# Patient Record
Sex: MALE | Race: WHITE | NOT HISPANIC OR LATINO | Employment: OTHER | URBAN - METROPOLITAN AREA
[De-identification: names, ages, dates, MRNs, and addresses within clinical notes are randomized per-mention and may not be internally consistent; named-entity substitution may affect disease eponyms.]

---

## 2023-10-27 PROCEDURE — 99282 EMERGENCY DEPT VISIT SF MDM: CPT

## 2023-10-27 PROCEDURE — 99283 EMERGENCY DEPT VISIT LOW MDM: CPT | Mod: 25

## 2023-10-27 PROCEDURE — 94760 N-INVAS EAR/PLS OXIMETRY 1: CPT

## 2023-10-27 ASSESSMENT — COLUMBIA-SUICIDE SEVERITY RATING SCALE - C-SSRS
6. HAVE YOU EVER DONE ANYTHING, STARTED TO DO ANYTHING, OR PREPARED TO DO ANYTHING TO END YOUR LIFE?: NO
2. HAVE YOU ACTUALLY HAD ANY THOUGHTS OF KILLING YOURSELF?: NO
1. IN THE PAST MONTH, HAVE YOU WISHED YOU WERE DEAD OR WISHED YOU COULD GO TO SLEEP AND NOT WAKE UP?: NO

## 2023-10-27 ASSESSMENT — PAIN DESCRIPTION - LOCATION: LOCATION: MOUTH

## 2023-10-27 ASSESSMENT — PAIN SCALES - GENERAL: PAINLEVEL_OUTOF10: 0 - NO PAIN

## 2023-10-27 ASSESSMENT — PAIN - FUNCTIONAL ASSESSMENT: PAIN_FUNCTIONAL_ASSESSMENT: 0-10

## 2023-10-28 ENCOUNTER — HOSPITAL ENCOUNTER (EMERGENCY)
Facility: HOSPITAL | Age: 88
Discharge: HOME | End: 2023-10-28
Payer: MEDICARE

## 2023-10-28 VITALS
HEART RATE: 80 BPM | TEMPERATURE: 97.9 F | WEIGHT: 221.34 LBS | RESPIRATION RATE: 16 BRPM | BODY MASS INDEX: 35.57 KG/M2 | HEIGHT: 66 IN | OXYGEN SATURATION: 96 % | DIASTOLIC BLOOD PRESSURE: 70 MMHG | SYSTOLIC BLOOD PRESSURE: 144 MMHG

## 2023-10-28 DIAGNOSIS — K06.8 BLEEDING GUMS: Primary | ICD-10-CM

## 2023-10-28 PROCEDURE — 2500000005 HC RX 250 GENERAL PHARMACY W/O HCPCS: Performed by: PHYSICIAN ASSISTANT

## 2023-10-28 PROCEDURE — 2500000001 HC RX 250 WO HCPCS SELF ADMINISTERED DRUGS (ALT 637 FOR MEDICARE OP): Performed by: PHYSICIAN ASSISTANT

## 2023-10-28 RX ORDER — TRANEXAMIC ACID 650 MG/1
1300 TABLET ORAL 3 TIMES DAILY
Status: DISCONTINUED | OUTPATIENT
Start: 2023-10-28 | End: 2023-10-28

## 2023-10-28 RX ORDER — TRANEXAMIC ACID 100 MG/ML
1000 INJECTION, SOLUTION INTRAVENOUS ONCE
Status: COMPLETED | OUTPATIENT
Start: 2023-10-28 | End: 2023-10-28

## 2023-10-28 RX ADMIN — TRANEXAMIC ACID 1000 MG: 1 INJECTION, SOLUTION INTRAVENOUS at 00:35

## 2023-10-28 RX ADMIN — MICROFIBRILLAR COLLAGEN HEMOSTAT POWDER 1 EACH: POWDER at 02:34

## 2023-10-28 ASSESSMENT — LIFESTYLE VARIABLES
EVER HAD A DRINK FIRST THING IN THE MORNING TO STEADY YOUR NERVES TO GET RID OF A HANGOVER: NO
HAVE PEOPLE ANNOYED YOU BY CRITICIZING YOUR DRINKING: NO
REASON UNABLE TO ASSESS: NO
HAVE YOU EVER FELT YOU SHOULD CUT DOWN ON YOUR DRINKING: NO
EVER FELT BAD OR GUILTY ABOUT YOUR DRINKING: NO

## 2023-10-28 NOTE — ED PROVIDER NOTES
[facility]   Chief Complaint   Patient presents with    Bleeding      Pt had oral surgery this morning at 11am for removal of his bottom teeth. Pt is currently on Eliquis and cannot get the bleeding to stop. Guaze and pressure have been applied prior to arrival.        HPI     Patient is a 89-year-old male presenting for evaluation of bleeding to his lower gums.  Patient is on a blood thinner and has been unable to get the bleeding to stop.  He was not told to hold his Eliquis today as he did have his bottom teeth removed by an oral surgeon earlier this afternoon at approximately 12 PM.  He denies pain.  Denying trouble breathing or swallowing.  Complaining only of active bleeding to the lower gumline.               No data recorded                Patient History   No past medical history on file.  No past surgical history on file.  No family history on file.  Social History     Tobacco Use    Smoking status: Not on file    Smokeless tobacco: Not on file   Substance Use Topics    Alcohol use: Not on file    Drug use: Not on file       Physical Exam   ED Triage Vitals [10/27/23 2335]   Temp Heart Rate Resp BP   -- 81 18 144/70      SpO2 Temp src Heart Rate Source Patient Position   96 % -- Brachial Sitting      BP Location FiO2 (%)     Right arm --       Physical Exam    GENERAL: Well nourished and well developed. Answers questions appropriately.    SKIN: Clean and dry without evidence of rashes.    HEENT: Skull normocephalic, atraumatic. No scleral icterus. PERRLA, with EOMI.  Mucous membranes moist and pink in color. Supple neck, trachea midline. No lymphadenopathy or masses.  Active bleeding from the lower gumline without evidence of teeth.  No obvious head trauma, palpable hematoma, huynh sign or raccoon eyes.    RESPIRATORY: Clear to ausculation with normal effort. No adventitious sounds, intercostal retractions or shallow breathing.    CARDIOVASCULAR: Regular rate and rhythm with normal S1, S2. No S3, S4, murmurs or  gallops. Capillary refill brisk, less than 3 seconds bilaterally. No unilateral lower extremity edema.    MSK: Spontaneously moves all 4 extremities without difficulty. Equal sensation of all 4 extremities.  No joint effusions, clubbing, cyanosis, or edema.    NEURO/PSYCH: A&Ox3. Cranial nerves II-XII grossly intact. No focal motor or sensory deficits. Appropriate mood and behavior.    ED Course & MDM   Diagnoses as of 10/28/23 0233   Bleeding gums       Medical Decision Making  Parts of this chart have been completed using voice recognition software. Please excuse any errors of transcription. Despite the medical decision making time stamp above-my medical decision making has taken place during the patient's entire visit. My thought process and reason for plan has been formulated from the time that I saw the patient until the time of disposition and is not specific to one specific moment during their visit and furthermore my MDM encompasses this entire chart and not only this text box.      HPI: Detailed above.    Exam: A medically appropriate exam performed, outlined above, given the known history and presentation.    History obtained from: patient    Social Determinants of Health considered during this visit: age, coming from home    EKG interpreted by my attending physician, reviewed by myself.    Labs/Diagnostics: considered but not indicated for this visit    Medications given during visit: Surgicell, collagen    Differential diagnoses considered for this visit include: Postop complication    Considerations/further MDM:    Patient is a 89-year-old male presenting for evaluation of bleeding of his lower gumline.  Given the presence of active bleeding, Surgicel was applied for attempted relief.    12:15am - surgicell was unsuccessful but there was increased in clotting. Ice was applied with continuous pressure with gauze pad.    12:30am - ice with pressure was unsuccessful. TXA topical solution applied to gauze  with patient now biting down and applying pressure.    1:34am -dentistry was consulted and I spoke directly to Dr. Bruce Espinoza, who recommended applying micro fibrillar collagen (Avitene) to the sockets.  He stated that is the only thing that would get the bleeding to stop given that the patient was not told to hold his anticoagulants and is bleeding from an open socket.    2:23am - Avitene was packed into patient's lower socket.  Bleeding subsided within 5 to 10 minutes.     Return precautions discussed with the patient and his daughter at the bedside.  Released to home in good condition.    Procedure  Procedures     Terri Ramos PA-C  10/28/23 0232

## 2023-10-28 NOTE — DISCHARGE INSTRUCTIONS
Your bleeding was able to be stopped here in the emergency department.  Continue to perform home care instructions as by your oral surgeon's recommendations.  Return to the emergency department if bleeding continues or you develop new symptoms, such as lightheadedness, dizziness, fatigue, chest pain or shortness of breath.

## 2023-11-01 ENCOUNTER — HOSPITAL ENCOUNTER (EMERGENCY)
Facility: HOSPITAL | Age: 88
Discharge: HOME | End: 2023-11-01
Attending: STUDENT IN AN ORGANIZED HEALTH CARE EDUCATION/TRAINING PROGRAM
Payer: MEDICARE

## 2023-11-01 VITALS
RESPIRATION RATE: 16 BRPM | WEIGHT: 220 LBS | OXYGEN SATURATION: 100 % | HEART RATE: 78 BPM | SYSTOLIC BLOOD PRESSURE: 143 MMHG | BODY MASS INDEX: 35.36 KG/M2 | TEMPERATURE: 99.3 F | HEIGHT: 66 IN | DIASTOLIC BLOOD PRESSURE: 71 MMHG

## 2023-11-01 DIAGNOSIS — K91.840 SURGICAL WOUND HEMORRHAGE AFTER DENTAL PROCEDURE: Primary | ICD-10-CM

## 2023-11-01 DIAGNOSIS — Z98.818 SURGICAL WOUND HEMORRHAGE AFTER DENTAL PROCEDURE: Primary | ICD-10-CM

## 2023-11-01 PROCEDURE — 2500000005 HC RX 250 GENERAL PHARMACY W/O HCPCS: Performed by: STUDENT IN AN ORGANIZED HEALTH CARE EDUCATION/TRAINING PROGRAM

## 2023-11-01 PROCEDURE — 96374 THER/PROPH/DIAG INJ IV PUSH: CPT

## 2023-11-01 PROCEDURE — 2500000001 HC RX 250 WO HCPCS SELF ADMINISTERED DRUGS (ALT 637 FOR MEDICARE OP): Performed by: STUDENT IN AN ORGANIZED HEALTH CARE EDUCATION/TRAINING PROGRAM

## 2023-11-01 PROCEDURE — 99284 EMERGENCY DEPT VISIT MOD MDM: CPT | Mod: 25 | Performed by: STUDENT IN AN ORGANIZED HEALTH CARE EDUCATION/TRAINING PROGRAM

## 2023-11-01 RX ORDER — TRANEXAMIC ACID 100 MG/ML
1000 INJECTION, SOLUTION INTRAVENOUS ONCE
Status: COMPLETED | OUTPATIENT
Start: 2023-11-01 | End: 2023-11-01

## 2023-11-01 RX ORDER — LIDOCAINE HYDROCHLORIDE AND EPINEPHRINE 10; 10 MG/ML; UG/ML
5 INJECTION, SOLUTION INFILTRATION; PERINEURAL ONCE
Status: COMPLETED | OUTPATIENT
Start: 2023-11-01 | End: 2023-11-01

## 2023-11-01 RX ADMIN — TRANEXAMIC ACID 1000 MG: 1 INJECTION, SOLUTION INTRAVENOUS at 01:59

## 2023-11-01 RX ADMIN — MICROFIBRILLAR COLLAGEN HEMOSTAT POWDER 1 EACH: POWDER at 02:00

## 2023-11-01 RX ADMIN — LIDOCAINE HYDROCHLORIDE,EPINEPHRINE BITARTRATE 5 ML: 10; .01 INJECTION, SOLUTION INFILTRATION; PERINEURAL at 02:01

## 2023-11-01 ASSESSMENT — PAIN DESCRIPTION - LOCATION: LOCATION: FACE

## 2023-11-01 ASSESSMENT — PAIN DESCRIPTION - PAIN TYPE: TYPE: ACUTE PAIN

## 2023-11-01 ASSESSMENT — LIFESTYLE VARIABLES
HAVE PEOPLE ANNOYED YOU BY CRITICIZING YOUR DRINKING: NO
EVER FELT BAD OR GUILTY ABOUT YOUR DRINKING: NO
EVER HAD A DRINK FIRST THING IN THE MORNING TO STEADY YOUR NERVES TO GET RID OF A HANGOVER: NO
HAVE YOU EVER FELT YOU SHOULD CUT DOWN ON YOUR DRINKING: NO
REASON UNABLE TO ASSESS: NO

## 2023-11-01 ASSESSMENT — PAIN SCALES - GENERAL
PAINLEVEL_OUTOF10: 2
PAINLEVEL_OUTOF10: 5 - MODERATE PAIN

## 2023-11-01 ASSESSMENT — PAIN DESCRIPTION - PROGRESSION: CLINICAL_PROGRESSION: NOT CHANGED

## 2023-11-01 ASSESSMENT — COLUMBIA-SUICIDE SEVERITY RATING SCALE - C-SSRS
2. HAVE YOU ACTUALLY HAD ANY THOUGHTS OF KILLING YOURSELF?: NO
1. IN THE PAST MONTH, HAVE YOU WISHED YOU WERE DEAD OR WISHED YOU COULD GO TO SLEEP AND NOT WAKE UP?: NO
6. HAVE YOU EVER DONE ANYTHING, STARTED TO DO ANYTHING, OR PREPARED TO DO ANYTHING TO END YOUR LIFE?: NO

## 2023-11-01 ASSESSMENT — PAIN DESCRIPTION - FREQUENCY: FREQUENCY: CONSTANT/CONTINUOUS

## 2023-11-01 ASSESSMENT — PAIN - FUNCTIONAL ASSESSMENT: PAIN_FUNCTIONAL_ASSESSMENT: 0-10

## 2023-11-01 ASSESSMENT — PAIN DESCRIPTION - DESCRIPTORS: DESCRIPTORS: ACHING

## 2023-11-01 ASSESSMENT — PAIN DESCRIPTION - ONSET: ONSET: ONGOING

## 2023-11-01 ASSESSMENT — PAIN DESCRIPTION - ORIENTATION: ORIENTATION: LEFT

## 2023-11-01 NOTE — ED PROVIDER NOTES
HPI   Chief Complaint   Patient presents with    Post-op Problem     Pt reports having oral surgery 10/27 and had to come into the ER for post op bleeding. Pt report bleeding stopped and tonight it restarted.       This is a 89-year-old male with a past medical history of A-fib on Eliquis, recent extraction of multiple teeth from the lower jaw on 10/27.  He was seen here that night for similar presentation with persistent bleeding from the gums.  He is taking his Eliquis again, his daughter at bedside state that they never had him stop the Eliquis other than the night before the procedure.  He denies any pain but states that he has been bleeding for the past few hours and is unable to get it to stop.  His daughter estimates that bleeding started around 930 tonight.      History provided by:  Patient and relative   used: No                        No data recorded                Patient History   History reviewed. No pertinent past medical history.  History reviewed. No pertinent surgical history.  No family history on file.  Social History     Tobacco Use    Smoking status: Not on file    Smokeless tobacco: Not on file   Substance Use Topics    Alcohol use: Not on file    Drug use: Not on file       Physical Exam   ED Triage Vitals [11/01/23 0138]   Temp Heart Rate Resp BP   37.4 °C (99.3 °F) 89 16 --      SpO2 Temp src Heart Rate Source Patient Position   100 % -- Monitor Sitting      BP Location FiO2 (%)     Left arm --       Physical Exam  General: well developed, well nourished elderly male who is awake and alert, holding bloody gauze in his mouth.  Eyes: sclera clear bilaterally, PERRL, EOMI  HENT: normocephalic, atraumatic. Pharynx without erythema or exudates, uvula midline.  No teeth in the lower jaw, there is a small excoriation to the left anterior lower gums that appears to be slowly oozing blood.  No pulsatile bleeding, no expanding hematoma noted.  CV: regular rate and rhythm, no  murmur, no gallops, or rubs.   Resp: clear to ascultation bilaterally, no wheezes, rales, or rhonchi    ED Course & MDM   Diagnoses as of 11/01/23 0320   Surgical wound hemorrhage after dental procedure       Medical Decision Making  PMH: CAD, asthma, A-fib on Eliquis s/p pacemaker placement, hypertension, hyperlipidemia  History obtained: directly from patient   Social factors affecting disposition: none    On arrival patient has bloodsoaked gauze in his mouth, he denies acute pain, denies any shortness of breath, chest pain, lightheadedness or dizziness associated with his bleeding.  He is still taking his Eliquis.  Vitals are normal, no signs of developing hemorrhagic shock.  After lidocaine with epinephrine was injected around the site of bleeding and TXA soaked gauze was placed in the patient's mouth, he was instructed to apply pressure with a TXA soaked gauze over the wound for 10 minutes.    On chart review from his visit from 10/27 he did have microfiber collagen pad placed over the area which achieved hemostasis at that time.  I did order this, however on reassessment the patient's bleeding was controlled after lidocaine with epinephrine injection and TXA.  The patient was observed in the ED without recurrence of his bleeding.  Collagen strip applied to prevent rebleeding. He was advised to follow-up with his oral surgeon regarding recurrent bleeding from the area.  He was discharged in stable condition.    Procedure  Procedures     Jass Ellsworth,   11/01/23 0320

## 2023-11-01 NOTE — DISCHARGE INSTRUCTIONS
Follow-up with your oral surgeon due to frequent episodes of bleeding after your procedure, return to the ED for any new or worsening symptoms including bleeding that you cannot get to stop especially if this is associated with lightheadedness or dizziness, shortness of breath, chest pain, passing out which could be signs of severe anemia due to blood loss and require further management by physician.

## 2024-02-12 ENCOUNTER — CLINICAL SUPPORT (OUTPATIENT)
Dept: AUDIOLOGY | Facility: CLINIC | Age: 89
End: 2024-02-12
Payer: MEDICARE

## 2024-02-12 DIAGNOSIS — H90.3 SENSORINEURAL HEARING LOSS (SNHL) OF BOTH EARS: ICD-10-CM

## 2024-02-12 PROCEDURE — 92557 COMPREHENSIVE HEARING TEST: CPT | Performed by: SOCIAL WORKER

## 2024-02-12 PROCEDURE — 92550 TYMPANOMETRY & REFLEX THRESH: CPT | Performed by: SOCIAL WORKER

## 2024-02-12 NOTE — PROGRESS NOTES
Name: Rcihi Llanos Jr.  YOB: 1930  Age: 89 y.o.    Date of Evaluation:  2/12/24    History:  Reason for visit:  Richi Llanos Jr. is seen today for an evaluation of hearing.  Patient complains of Hearing Loss (Re-evaluation/Previous hearing test 2/8/2023).  He further reports that he is having trouble with his hearing aids and  case. One of them is not charging and he's not sure if it's the case or the hearing aid.  [employer]o M50R aids were dispensed 2/18/21  They are under warranty until 5/17/24    Evaluation:  Otoscopy revealed moderate cerumen in the right ear and a clear left canal  Immittance testing indicated normal tympanograms bilaterally  Ipsilateral acoustic reflexes were elevated in the right ear and elevated/absent in the left ear at 500-4000 Hz  Behavioral hearing testing indicated a moderate sloping to severe sensorineural hearing loss at 125-8000 Hz bilaterally  Word recognition testing was completed using recorded speech at the patient's most comfortable level as documented on the audiogram.  Scores were 32% correct at 85 dBHL in the right ear and at 52% at 85 dBHL in the left ear.    Summary:  Today's results are consistent with a moderate sloping to severe sensorineural hearing loss at 125-8000 Hz bilaterally. Word recognition is poor in each ear at elevated presentation levels in each ear. Today's score are a decrease compared to 2/8/23  Mr. Shankar hearing aids and  will be sent to [employer] for in warranty service.  [facility]inic [employer] M90R aids were programmed for use until his aids are returned from the   Right [id number] and Left [id number]    Treatment Plan:  Follow up with PCP as directed  Retest hearing 12 months  Consistent use of binaural aids

## 2024-02-29 ENCOUNTER — CLINICAL SUPPORT (OUTPATIENT)
Dept: AUDIOLOGY | Facility: CLINIC | Age: 89
End: 2024-02-29
Payer: MEDICARE

## 2024-02-29 DIAGNOSIS — H90.3 SENSORINEURAL HEARING LOSS (SNHL) OF BOTH EARS: ICD-10-CM

## 2024-02-29 PROCEDURE — V5299 HEARING SERVICE: HCPCS | Performed by: SOCIAL WORKER

## 2024-02-29 NOTE — PROGRESS NOTES
Name: Richi Llanos Jr.  YOB: 1930  Age: 89 y.o.    Date of Evaluation:  2/29/24    History:  Reason for visit:  Richi Llanos Jr. presents for a Hearing Aid Check.  He is here accompanied by his daughter to  his in-warranty repaired hearing aids and in-warranty replacement .  Aids are under warranty until 5/17/24  Verified settings in [employer] software  Patient is very pleased with sound quality  He was last seen 2/12/24 for an audiological re-evaluation            Treatment Plan:  Consistent use of binaural hearing aids  Return for annual audiograms.    VANGIE Lema, CCC-A  Audiologist    Time:

## 2024-11-23 ENCOUNTER — HOSPITAL ENCOUNTER (EMERGENCY)
Facility: HOSPITAL | Age: 89
Discharge: HOME | End: 2024-11-23
Payer: MEDICARE

## 2024-11-23 ENCOUNTER — APPOINTMENT (OUTPATIENT)
Dept: RADIOLOGY | Facility: HOSPITAL | Age: 89
End: 2024-11-23
Payer: MEDICARE

## 2024-11-23 ENCOUNTER — APPOINTMENT (OUTPATIENT)
Dept: CARDIOLOGY | Facility: HOSPITAL | Age: 89
End: 2024-11-23
Payer: MEDICARE

## 2024-11-23 VITALS
SYSTOLIC BLOOD PRESSURE: 115 MMHG | RESPIRATION RATE: 20 BRPM | HEART RATE: 71 BPM | HEIGHT: 66 IN | TEMPERATURE: 97.5 F | OXYGEN SATURATION: 97 % | WEIGHT: 220 LBS | DIASTOLIC BLOOD PRESSURE: 59 MMHG | BODY MASS INDEX: 35.36 KG/M2

## 2024-11-23 DIAGNOSIS — J20.9 ACUTE BRONCHITIS, UNSPECIFIED ORGANISM: Primary | ICD-10-CM

## 2024-11-23 LAB
ALBUMIN SERPL BCP-MCNC: 3.9 G/DL (ref 3.4–5)
ALP SERPL-CCNC: 73 U/L (ref 33–136)
ALT SERPL W P-5'-P-CCNC: 14 U/L (ref 10–52)
ANION GAP SERPL CALCULATED.3IONS-SCNC: 10 MMOL/L (ref 10–20)
AST SERPL W P-5'-P-CCNC: 18 U/L (ref 9–39)
BASOPHILS # BLD AUTO: 0.06 X10*3/UL (ref 0–0.1)
BASOPHILS NFR BLD AUTO: 1 %
BILIRUB SERPL-MCNC: 0.7 MG/DL (ref 0–1.2)
BNP SERPL-MCNC: 294 PG/ML (ref 0–99)
BUN SERPL-MCNC: 22 MG/DL (ref 6–23)
CALCIUM SERPL-MCNC: 8.8 MG/DL (ref 8.6–10.3)
CARDIAC TROPONIN I PNL SERPL HS: 25 NG/L (ref 0–20)
CARDIAC TROPONIN I PNL SERPL HS: 27 NG/L (ref 0–20)
CHLORIDE SERPL-SCNC: 108 MMOL/L (ref 98–107)
CO2 SERPL-SCNC: 27 MMOL/L (ref 21–32)
CREAT SERPL-MCNC: 1.31 MG/DL (ref 0.5–1.3)
EGFRCR SERPLBLD CKD-EPI 2021: 50 ML/MIN/1.73M*2
EOSINOPHIL # BLD AUTO: 0.18 X10*3/UL (ref 0–0.4)
EOSINOPHIL NFR BLD AUTO: 3 %
ERYTHROCYTE [DISTWIDTH] IN BLOOD BY AUTOMATED COUNT: 12.5 % (ref 11.5–14.5)
FLUAV RNA RESP QL NAA+PROBE: NOT DETECTED
FLUBV RNA RESP QL NAA+PROBE: NOT DETECTED
GLUCOSE SERPL-MCNC: 100 MG/DL (ref 74–99)
HCT VFR BLD AUTO: 32.8 % (ref 41–52)
HGB BLD-MCNC: 11.1 G/DL (ref 13.5–17.5)
IMM GRANULOCYTES # BLD AUTO: 0.01 X10*3/UL (ref 0–0.5)
IMM GRANULOCYTES NFR BLD AUTO: 0.2 % (ref 0–0.9)
LYMPHOCYTES # BLD AUTO: 1.34 X10*3/UL (ref 0.8–3)
LYMPHOCYTES NFR BLD AUTO: 22.5 %
MCH RBC QN AUTO: 32.9 PG (ref 26–34)
MCHC RBC AUTO-ENTMCNC: 33.8 G/DL (ref 32–36)
MCV RBC AUTO: 97 FL (ref 80–100)
MONOCYTES # BLD AUTO: 0.68 X10*3/UL (ref 0.05–0.8)
MONOCYTES NFR BLD AUTO: 11.4 %
NEUTROPHILS # BLD AUTO: 3.69 X10*3/UL (ref 1.6–5.5)
NEUTROPHILS NFR BLD AUTO: 61.9 %
NRBC BLD-RTO: 0 /100 WBCS (ref 0–0)
PLATELET # BLD AUTO: 149 X10*3/UL (ref 150–450)
POTASSIUM SERPL-SCNC: 4.1 MMOL/L (ref 3.5–5.3)
PROT SERPL-MCNC: 6.1 G/DL (ref 6.4–8.2)
RBC # BLD AUTO: 3.37 X10*6/UL (ref 4.5–5.9)
SARS-COV-2 RNA RESP QL NAA+PROBE: NOT DETECTED
SODIUM SERPL-SCNC: 141 MMOL/L (ref 136–145)
WBC # BLD AUTO: 6 X10*3/UL (ref 4.4–11.3)

## 2024-11-23 PROCEDURE — 80053 COMPREHEN METABOLIC PANEL: CPT | Performed by: PHYSICIAN ASSISTANT

## 2024-11-23 PROCEDURE — 87636 SARSCOV2 & INF A&B AMP PRB: CPT | Performed by: PHYSICIAN ASSISTANT

## 2024-11-23 PROCEDURE — 84484 ASSAY OF TROPONIN QUANT: CPT | Performed by: PHYSICIAN ASSISTANT

## 2024-11-23 PROCEDURE — 71046 X-RAY EXAM CHEST 2 VIEWS: CPT | Performed by: RADIOLOGY

## 2024-11-23 PROCEDURE — 94640 AIRWAY INHALATION TREATMENT: CPT

## 2024-11-23 PROCEDURE — 71046 X-RAY EXAM CHEST 2 VIEWS: CPT

## 2024-11-23 PROCEDURE — 2500000004 HC RX 250 GENERAL PHARMACY W/ HCPCS (ALT 636 FOR OP/ED): Performed by: PHYSICIAN ASSISTANT

## 2024-11-23 PROCEDURE — 2500000002 HC RX 250 W HCPCS SELF ADMINISTERED DRUGS (ALT 637 FOR MEDICARE OP, ALT 636 FOR OP/ED): Mod: MUE | Performed by: PHYSICIAN ASSISTANT

## 2024-11-23 PROCEDURE — 93005 ELECTROCARDIOGRAM TRACING: CPT

## 2024-11-23 PROCEDURE — 2500000001 HC RX 250 WO HCPCS SELF ADMINISTERED DRUGS (ALT 637 FOR MEDICARE OP): Performed by: PHYSICIAN ASSISTANT

## 2024-11-23 PROCEDURE — 83880 ASSAY OF NATRIURETIC PEPTIDE: CPT | Performed by: PHYSICIAN ASSISTANT

## 2024-11-23 PROCEDURE — 36415 COLL VENOUS BLD VENIPUNCTURE: CPT | Performed by: PHYSICIAN ASSISTANT

## 2024-11-23 PROCEDURE — 99283 EMERGENCY DEPT VISIT LOW MDM: CPT | Mod: 25

## 2024-11-23 PROCEDURE — 85025 COMPLETE CBC W/AUTO DIFF WBC: CPT | Performed by: PHYSICIAN ASSISTANT

## 2024-11-23 RX ORDER — ACETAMINOPHEN 500 MG
1000 TABLET ORAL ONCE
Status: DISCONTINUED | OUTPATIENT
Start: 2024-11-23 | End: 2024-11-23 | Stop reason: HOSPADM

## 2024-11-23 RX ORDER — GUAIFENESIN 600 MG/1
1200 TABLET, EXTENDED RELEASE ORAL 2 TIMES DAILY
Qty: 28 TABLET | Refills: 0 | Status: SHIPPED | OUTPATIENT
Start: 2024-11-23 | End: 2024-11-30

## 2024-11-23 RX ORDER — PREDNISONE 20 MG/1
40 TABLET ORAL ONCE
Status: COMPLETED | OUTPATIENT
Start: 2024-11-23 | End: 2024-11-23

## 2024-11-23 RX ORDER — BENZONATATE 100 MG/1
200 CAPSULE ORAL ONCE
Status: COMPLETED | OUTPATIENT
Start: 2024-11-23 | End: 2024-11-23

## 2024-11-23 RX ORDER — BENZONATATE 100 MG/1
100 CAPSULE ORAL 3 TIMES DAILY PRN
Qty: 21 CAPSULE | Refills: 0 | Status: SHIPPED | OUTPATIENT
Start: 2024-11-23 | End: 2024-11-30

## 2024-11-23 RX ORDER — PREDNISONE 20 MG/1
TABLET ORAL
Qty: 7 TABLET | Refills: 0 | Status: SHIPPED | OUTPATIENT
Start: 2024-11-23 | End: 2024-11-29

## 2024-11-23 RX ORDER — IPRATROPIUM BROMIDE AND ALBUTEROL SULFATE 2.5; .5 MG/3ML; MG/3ML
3 SOLUTION RESPIRATORY (INHALATION) ONCE
Status: COMPLETED | OUTPATIENT
Start: 2024-11-23 | End: 2024-11-23

## 2024-11-23 RX ADMIN — PREDNISONE 40 MG: 20 TABLET ORAL at 14:13

## 2024-11-23 RX ADMIN — BENZONATATE 200 MG: 100 CAPSULE ORAL at 13:18

## 2024-11-23 RX ADMIN — IPRATROPIUM BROMIDE AND ALBUTEROL SULFATE 3 ML: .5; 2.5 SOLUTION RESPIRATORY (INHALATION) at 13:18

## 2024-11-23 ASSESSMENT — LIFESTYLE VARIABLES
HAVE YOU EVER FELT YOU SHOULD CUT DOWN ON YOUR DRINKING: NO
TOTAL SCORE: 0
HAVE PEOPLE ANNOYED YOU BY CRITICIZING YOUR DRINKING: NO
EVER FELT BAD OR GUILTY ABOUT YOUR DRINKING: NO
EVER HAD A DRINK FIRST THING IN THE MORNING TO STEADY YOUR NERVES TO GET RID OF A HANGOVER: NO

## 2024-11-23 ASSESSMENT — PAIN SCALES - GENERAL: PAINLEVEL_OUTOF10: 2

## 2024-11-23 ASSESSMENT — PAIN - FUNCTIONAL ASSESSMENT: PAIN_FUNCTIONAL_ASSESSMENT: 0-10

## 2024-11-23 NOTE — DISCHARGE INSTRUCTIONS
take prednisone, guaifenesin and benzonatate as prescribed.  I recommend you use your albuterol inhaler 1 to 2 puffs every 6 hours as needed for cough.  I recommend following up with your primary care doctor.  I recommend using over-the-counter Tylenol as this will help with pain with coughing.  Return to the emergency department if you have new or worsening symptoms including shortness of breath, worsening cough, worsening pain with coughing, coughing up blood, fevers, decrease in appetite with nausea or vomiting   Hydroquinone Counseling:  Patient advised that medication may result in skin irritation, lightening (hypopigmentation), dryness, and burning.  In the event of skin irritation, the patient was advised to reduce the amount of the drug applied or use it less frequently.  Rarely, spots that are treated with hydroquinone can become darker (pseudoochronosis).  Should this occur, patient instructed to stop medication and call the office. The patient verbalized understanding of the proper use and possible adverse effects of hydroquinone.  All of the patient's questions and concerns were addressed.

## 2024-11-24 NOTE — ED PROVIDER NOTES
HPI   Chief Complaint   Patient presents with    Cough       This is a 89-year-old male presenting to the emergency department in the company of his daughter for concerns of dry cough x 2 days.  Patient states that over the last 2 days he has been having a dry nonproductive cough.  He states that when he is coughing and is causing discomfort in his chest.  He denies any recent travel or recent known sick contacts.  He denies any unilateral chest wall pain and he states that he has not had pain other than when he is coughing.  He denies any palpitations, fever, chills, diaphoresis, shortness of breath, abdominal pain or back pain, nausea, vomiting or diarrhea.  He has been eating and drinking appropriately.  He denies any swelling in his lower extremities.  Patient denies any recent surgeries, recent immobilizations, hemoptysis, previous history of DVT or PE.  He states in addition to the dry cough he does have a sore throat.      Please see HPI for pertinent positive and negative ROS.         Patient History   No past medical history on file.  No past surgical history on file.  No family history on file.  Social History     Tobacco Use    Smoking status: Not on file    Smokeless tobacco: Not on file   Substance Use Topics    Alcohol use: Not on file    Drug use: Not on file       Physical Exam   ED Triage Vitals [11/23/24 1236]   Temperature Heart Rate Respirations BP   36.4 °C (97.5 °F) 78 16 (!) 181/88      Pulse Ox Temp Source Heart Rate Source Patient Position   97 % Oral Monitor --      BP Location FiO2 (%)     -- --       Physical Exam  GENERAL APPEARANCE: Awake and alert. No acute respiratory distress.  Patient is talking in smooth nondyspneic sentences.  VITAL SIGNS: As per the nurses' triage record.  HEENT: Normocephalic, atraumatic. Extraocular muscles are intact. Conjunctiva are pink. Negative scleral icterus. Mucous membranes are moist. Tongue in the midline. Oropharynx clear, uvula midline.  There is  erythema of posterior oropharynx.  No tonsillar hypertrophy or exudate bilaterally.  NECK: Soft, nontender and supple, full gross ROM, no meningeal signs.  CHEST: Nontender to palpation. Clear to auscultation bilaterally. No rales, rhonchi, or wheezing. Symmetric rise and fall of chest wall.   HEART: Clear S1 and S2. Regular rate. Strong and equal pulses in the extremities.  ABDOMEN: Soft, nontender, nondistended, positive bowel sounds, no palpable masses.  MUSCULOSKELETAL: The calves are nontender to palpation.  There is no pitting pedal edema bilaterally.  Full gross active range of motion. Ambulating on own with no acute difficulties  NEUROLOGICAL: Awake, alert and oriented x 3. Motor power intact in the upper and lower extremities. Sensation is intact to light touch in the upper and lower extremities. Patient answering questions appropriately.   IMMUNOLOGICAL: No lymphatic streaking noted  DERMATOLOGIC: Warm and dry without petechiae, rashes, or ecchymosis noted on visible skin.   PYSCH: Cooperative with appropriate mood and affect.    ED Course & MDM   ED Course as of 11/24/24 1136   Sat Nov 23, 2024   1329 I personally reviewed and interpreted the EKG @1322: Afib 66, normal axis, no appreciable ischemia, non-specific TW findings, and no prior EKG available for review [BC]   1444 Ambulation trial with pulse ox at 95 to 97% on room air.  Patient did not have any shortness of breath. [SH]      ED Course User Index  [BC] Deuce Tirado MD  [SH] Deena Tompkins PA-C         Diagnoses as of 11/24/24 1136   Acute bronchitis, unspecified organism                 No data recorded     Hayes Coma Scale Score: 15 (11/23/24 1240 : Destinee Zuniga, RN)                           Medical Decision Making  Parts of this chart have been completed using voice recognition software. Please excuse any errors of transcription.  My thought process and reason for plan has been formulated from the time that I saw the patient  until the time of disposition and is not specific to one specific moment during their visit and furthermore my MDM encompasses this entire chart and not only this text box.      HPI: Detailed above.    Exam: A medically appropriate exam performed, outlined above, given the known history and presentation.    History obtained from: Patient and patient's daughter who was present with him today.    EKG: See my supervising physician's EKG interpretation    Medications given during visit:  Medications   benzonatate (Tessalon) capsule 200 mg (200 mg oral Given 11/23/24 1318)   ipratropium-albuteroL (Duo-Neb) 0.5-2.5 mg/3 mL nebulizer solution 3 mL (3 mL nebulization Given 11/23/24 1318)   predniSONE (Deltasone) tablet 40 mg (40 mg oral Given 11/23/24 1413)        Diagnostic/tests  Labs Reviewed   CBC WITH AUTO DIFFERENTIAL - Abnormal       Result Value    WBC 6.0      nRBC 0.0      RBC 3.37 (*)     Hemoglobin 11.1 (*)     Hematocrit 32.8 (*)     MCV 97      MCH 32.9      MCHC 33.8      RDW 12.5      Platelets 149 (*)     Neutrophils % 61.9      Immature Granulocytes %, Automated 0.2      Lymphocytes % 22.5      Monocytes % 11.4      Eosinophils % 3.0      Basophils % 1.0      Neutrophils Absolute 3.69      Immature Granulocytes Absolute, Automated 0.01      Lymphocytes Absolute 1.34      Monocytes Absolute 0.68      Eosinophils Absolute 0.18      Basophils Absolute 0.06     COMPREHENSIVE METABOLIC PANEL - Abnormal    Glucose 100 (*)     Sodium 141      Potassium 4.1      Chloride 108 (*)     Bicarbonate 27      Anion Gap 10      Urea Nitrogen 22      Creatinine 1.31 (*)     eGFR 50 (*)     Calcium 8.8      Albumin 3.9      Alkaline Phosphatase 73      Total Protein 6.1 (*)     AST 18      Bilirubin, Total 0.7      ALT 14     B-TYPE NATRIURETIC PEPTIDE - Abnormal     (*)     Narrative:        <100 pg/mL - Heart failure unlikely  100-299 pg/mL - Intermediate probability of acute heart                  failure  exacerbation. Correlate with clinical                  context and patient history.    >=300 pg/mL - Heart Failure likely. Correlate with clinical                  context and patient history.    BNP testing is performed using different testing methodology at [facility] than at other system [facility]. Direct result comparisons should only be made within the same method.      SERIAL TROPONIN-INITIAL - Abnormal    Troponin I, High Sensitivity 27 (*)     Narrative:     Less than 99th percentile of normal range cutoff-  Female and children under 18 years old <14 ng/L; Male <21 ng/L: Negative  Repeat testing should be performed if clinically indicated.     Female and children under 18 years old 14-50 ng/L; Male 21-50 ng/L:  Consistent with possible cardiac damage and possible increased clinical   risk. Serial measurements may help to assess extent of myocardial damage.     >50 ng/L: Consistent with cardiac damage, increased clinical risk and  myocardial infarction. Serial measurements may help assess extent of   myocardial damage.      NOTE: Children less than 1 year old may have higher baseline troponin   levels and results should be interpreted in conjunction with the overall   clinical context.     NOTE: Troponin I testing is performed using a different   testing methodology at [facility] than at other   [facility]. Direct result comparisons should only   be made within the same method.   SERIAL TROPONIN, 1 HOUR - Abnormal    Troponin I, High Sensitivity 25 (*)     Narrative:     Less than 99th percentile of normal range cutoff-  Female and children under 18 years old <14 ng/L; Male <21 ng/L: Negative  Repeat testing should be performed if clinically indicated.     Female and children under 18 years old 14-50 ng/L; Male 21-50 ng/L:  Consistent with possible cardiac damage and possible increased clinical   risk. Serial measurements may help to assess extent of myocardial damage.      >50 ng/L: Consistent with cardiac damage, increased clinical risk and  myocardial infarction. Serial measurements may help assess extent of   myocardial damage.      NOTE: Children less than 1 year old may have higher baseline troponin   levels and results should be interpreted in conjunction with the overall   clinical context.     NOTE: Troponin I testing is performed using a different   testing methodology at [facility] than at [facility]. Direct result comparisons should only   be made within the same method.   SARS-COV-2 PCR - Normal    Coronavirus 2019, PCR Not Detected      Narrative:     This assay has received FDA Emergency Use Authorization (EUA) and is only authorized for the duration of time that circumstances exist to justify the authorization of the emergency use of in vitro diagnostic tests for the detection of SARS-CoV-2 virus and/or diagnosis of COVID-19 infection under section 564(b)(1) of the Act, 21 U.S.C. 360bbb-3(b)(1). This assay is an in vitro diagnostic nucleic acid amplification test for the qualitative detection of SARS-CoV-2 from nasopharyngeal specimens and has been validated for use at [facility]. Negative results do not preclude COVID-19 infections and should not be used as the sole basis for diagnosis, treatment, or other management decisions.     INFLUENZA A AND B PCR - Normal    Flu A Result Not Detected      Flu B Result Not Detected      Narrative:     This assay is an in vitro diagnostic multiplex nucleic acid amplification test for the detection and discrimination of Influenza A & B from nasopharyngeal specimens, and has been validated for use at [facility]. Negative results do not preclude Influenza A/B infections, and should not be used as the sole basis for diagnosis, treatment, or other management decisions. If Influenza A/B and RSV PCR results are negative, testing for Parainfluenza virus,  Adenovirus and Metapneumovirus is routinely performed for [facility] pediatric oncology and intensive care inpatients, and is available on other patients by placing an add-on request.   TROPONIN SERIES- (INITIAL, 1 HR)    Narrative:     The following orders were created for panel order Troponin I Series, High Sensitivity (0, 1 HR).  Procedure                               Abnormality         Status                     ---------                               -----------         ------                     Troponin I, High Sensiti...[[id number]]  Abnormal            Final result               Troponin, High Sensitivi...[[id number]]  Abnormal            Final result                 Please view results for these tests on the individual orders.      XR chest 2 views   Final Result   1.  No evidence of acute cardiopulmonary process. Stable chest. See   discussion above.                  MACRO:   None        Signed by: Joseph Schoenberger 11/23/2024 1:10 PM   Dictation workstation:   [id number]           Considerations/further MDM:    Patient does present with elevated blood pressure but otherwise stable vital signs.  Blood pressure did improve while he was in the emergency department.  My differential diagnosis includes but is not limited to viral etiology/acute bronchitis versus acute pharyngitis versus pneumonia.  Due to patient complaining of discomfort in his chest when he is coughing I did add on troponins and BNP.  I do suspect the chest discomfort with coughing is secondary to a pleuritic pain.  Troponin I x 2 flat at 27 and 25.  BNP is 294.  Chest x-ray does not show any evidence of acute CHF, patient has no pedal edema of the lower extremities, and he is not hypoxic therefore I do have very low suspicion for acute CHF as etiology of cough.  Chest x-ray does not show any evidence of cardiopulmonary process.  Very low suspicion for pulmonary embolism as patient is not hypoxic or tachycardic.  He is not complaining of  shortness of breath.  No hemoptysis with the cough.  No identifiable risk factors in HPI.  CBC shows no leukocytosis.  CMP shows stable renal function for patient.  Negative viral swabs.  Patient was given benzonatate, prednisone and breathing treatment.  He was discharged with a prescription for prednisone taper and benzonatate.  He was also instructed to resume his albuterol inhaler as prescribed.  I do have very high suspicion for viral lower respiratory tract infection.  Patient and his daughter felt comfortable discharge plan home.  The patient was discharged in stable condition.      Procedure  Procedures     Deena Tompkins PA-C  11/24/24 1135

## 2024-11-26 LAB
Q ONSET: 212 MS
QRS COUNT: 11 BEATS
QRS DURATION: 104 MS
QT INTERVAL: 390 MS
QTC CALCULATION(BAZETT): 408 MS
QTC FREDERICIA: 403 MS
R AXIS: 53 DEGREES
T AXIS: 73 DEGREES
T OFFSET: 407 MS
VENTRICULAR RATE: 66 BPM

## 2025-03-03 ENCOUNTER — APPOINTMENT (OUTPATIENT)
Dept: WOUND CARE | Facility: HOSPITAL | Age: OVER 89
End: 2025-03-03
Payer: MEDICARE

## 2025-04-03 ENCOUNTER — CLINICAL SUPPORT (OUTPATIENT)
Dept: AUDIOLOGY | Facility: CLINIC | Age: OVER 89
End: 2025-04-03
Payer: MEDICARE

## 2025-04-03 DIAGNOSIS — H90.3 SENSORINEURAL HEARING LOSS (SNHL) OF BOTH EARS: ICD-10-CM

## 2025-04-03 PROCEDURE — 92567 TYMPANOMETRY: CPT | Performed by: SOCIAL WORKER

## 2025-04-03 PROCEDURE — 92557 COMPREHENSIVE HEARING TEST: CPT | Performed by: SOCIAL WORKER

## 2025-04-03 NOTE — PROGRESS NOTES
Name: Richi Llanos Jr.  YOB: 1930  Age: 89 y.o.    Date of Evaluation:  4/3/25    History:  Reason for visit:  Richi Llanos Jr. is seen today at the request of No Assigned PCP Generic Provider, MD for an evaluation of hearing.  Patient complains of Hearing Loss. Previous audiogram 2/12/24. Mr. Llanos wears [employer] [id number] aids that were dispensed 2/18/21. He reports hearing well with his hearing aids  He is accompanied by his daughter today  She states that he has dementia    Evaluation:  Otoscopy revealed excessive cerumen in the right ear and a clear left ear canal  Immittance testing indicated type A tympanograms bilaterally  Ipsilateral acoustic reflexes were could not be assessed due to inability to maintain a seal    Behavioral hearing testing indicated a moderate sloping to severe sensorineural hearing loss at 125-8000 Hz bilaterally  Word recognition testing was completed using recorded speech at the patient's most comfortable level as documented on the audiogram.    Scores were 48% correct in the right ear and 76% correct in the left ear    Summary:  Today's results are consistent with a moderate sloping to severe sensorineural hearing loss at 125-8000 Hz bilaterally  Word recognition is poor in the right ear and fair in the left ear at significantly louder than conversational loudness  Hearing loss is stable compared to 2.12.2024 results    Treatment Plan:  Follow up with PCP as directed  Consistent use of binaural amplification  Retest hearing in 12 months